# Patient Record
Sex: FEMALE | Race: WHITE | URBAN - METROPOLITAN AREA
[De-identification: names, ages, dates, MRNs, and addresses within clinical notes are randomized per-mention and may not be internally consistent; named-entity substitution may affect disease eponyms.]

---

## 2017-11-27 ENCOUNTER — HOSPITAL ENCOUNTER (OUTPATIENT)
Dept: BEHAVIORAL HEALTH | Facility: CLINIC | Age: 22
Discharge: HOME | End: 2017-11-27
Attending: SOCIAL WORKER | Admitting: SOCIAL WORKER
Payer: COMMERCIAL

## 2017-11-27 VITALS — HEIGHT: 59 IN | WEIGHT: 142 LBS | BODY MASS INDEX: 28.63 KG/M2

## 2017-11-27 PROCEDURE — H0001 ALCOHOL AND/OR DRUG ASSESS: HCPCS

## 2017-11-27 ASSESSMENT — ANXIETY QUESTIONNAIRES
5. BEING SO RESTLESS THAT IT IS HARD TO SIT STILL: SEVERAL DAYS
IF YOU CHECKED OFF ANY PROBLEMS ON THIS QUESTIONNAIRE, HOW DIFFICULT HAVE THESE PROBLEMS MADE IT FOR YOU TO DO YOUR WORK, TAKE CARE OF THINGS AT HOME, OR GET ALONG WITH OTHER PEOPLE: EXTREMELY DIFFICULT
1. FEELING NERVOUS, ANXIOUS, OR ON EDGE: SEVERAL DAYS
3. WORRYING TOO MUCH ABOUT DIFFERENT THINGS: SEVERAL DAYS
6. BECOMING EASILY ANNOYED OR IRRITABLE: SEVERAL DAYS
4. TROUBLE RELAXING: SEVERAL DAYS
GAD7 TOTAL SCORE: 7
2. NOT BEING ABLE TO STOP OR CONTROL WORRYING: SEVERAL DAYS
7. FEELING AFRAID AS IF SOMETHING AWFUL MIGHT HAPPEN: SEVERAL DAYS

## 2017-11-27 ASSESSMENT — PATIENT HEALTH QUESTIONNAIRE - PHQ9: SUM OF ALL RESPONSES TO PHQ QUESTIONS 1-9: 7

## 2017-11-27 ASSESSMENT — PAIN SCALES - GENERAL: PAINLEVEL: SEVERE PAIN (6)

## 2017-11-27 NOTE — PROGRESS NOTES
"Rule 25 Assessment  Background Information   1. Date of Assessment Request 11/22/17 2. Date of Assessment  11/27/17 3. Date Service Authorized     4.   PETER Jimenez   5.  Phone Number   [phone] 6. Referent  [facility] CPS 7. Assessment Site  [facility]     8. Client Name   Yessenia Garcia 9. Date of Birth  1995 Age  22 year old 10. Gender  female  11. PMI/ Insurance No.  [id number]   12. Client's Primary Language:  English 13. Do you require special accommodations, such as an  or assistance with written material? No   14. Current Address: [address] [place] [address]   15. Client Phone Numbers: [phone] (home)      16. Tell me what has happened to bring you here today.    11/14/17- I was caught with a bag of clean needles and one dirty needle in my car and my son was in the car.  I had Benadryl in the car and I had my prescription pill on me but not in the bottle.  Charges pending, no court date.  [facility] CPS referred for evaluation.    17. Have you had other rule 25 assessments?     No    DIMENSION I - Acute Intoxication /Withdrawal Potential   1. Chemical use most recent 12 months outside a facility and other significant use history (client self-report)              X = Primary Drug Used   Age of First Use Most Recent Pattern of Use and Duration   Need enough information to show pattern (both frequency and amounts) and to show tolerance for each chemical that has a diagnosis   Date of last use and time, if needed   Withdrawal Potential? Requiring special care Method of use  (oral, smoked, snort, IV, etc)      Alcohol     unsp  age 19: wknds   \"years ago\" no oral      Marijuana/  Hashish   14  2-3x/week, 1-2 hits unsp no smoke      Cocaine/Crack     N/A           Meth/  Amphetamines   N/A  RX Adderall (since age 14)  Denies any abuse.         Heroin     22  past 2 mos: near daily, quarter gram/day 11/25/17  6pm no " IV  snort   x   Other Opiates/  Synthetics   12  Oxy  Past year: Near daily, up 60mg/day  1+ year: less frequent/less amt 15 no Oral  snort      Inhalants     N/A           Benzodiazepines     N/A  current RX Xanax  Prior RX Clonazpem (since age 14)         Hallucinogens     N/A           Barbiturates/  Sedatives/  Hypnotics N/A           Over-the-Counter Drugs   N/A           Other     N/A           Nicotine     N/A          2. Do you use greater amounts of alcohol/other drugs to feel intoxicated or achieve the desired effect?  Yes.  Or use the same amount and get less of an effect?  Yes.  Example: increase tolerance    3A. Have you ever been to detox?     No    3B. When was the first time?     NA    3C. How many times since then?     NA    3D. Date of most recent detox:     NA    4.  Withdrawal symptoms: Have you had any of the following withdrawal symptoms?  Past 12 months Recent (past 30 days)   None Sweating (Rapid Pulse)  Shaky / Jittery / Tremors  Unable to Sleep  Agitation  Headache  Fatigue / Extremely Tired  Sad / Depressed Feeling  Muscle Aches  Irritability  High Blood Pressure  Nausea / Vomiting  Dizziness  Diarrhea  Anxiety / Worried     's Visual Observations and Symptoms: No visible withdrawal symptoms at this time    Based on the above information, is withdrawal likely to require attention as part of treatment participation?  No    Dimension I Ratings   Acute intoxication/Withdrawal potential - The placing authority must use the criteria in Dimension I to determine a client s acute intoxication and withdrawal potential.    RISK DESCRIPTIONS - Severity ratin Client displays full functioning with good ability to tolerate and cope with withdrawal discomfort. No signs or symptoms of intoxication or withdrawal or resolving signs or symptoms.    REASONS SEVERITY WAS ASSIGNED (What about the amount of the person s use and date of most recent use and history of withdrawal problems suggests  the potential of withdrawal symptoms requiring professional assistance? )     No signs/symptoms of intoxication or withdrawal observed.  Client did report some discomfort this day but no immediate concern.         DIMENSION II - Biomedical Complications and Conditions   1. Do you have any current health/medical conditions?(Include any infectious diseases, allergies, or chronic or acute pain, history of chronic conditions)       Yes.   Illnesses/Medical Conditions you are receiving care for: fibromyalgia, eleonora RA, sciatica.    2. Do you have a health care provider? When was your most recent appointment? What concerns were identified?     [place] Dr. Kya Irizarry    3. If indicated by answers to items 1 or 2: How do you deal with these concerns? Is that working for you? If you are not receiving care for this problem, why not?      NA    4A. List current medication(s) including over-the-counter or herbal supplements--including pain management:     Current Outpatient Prescriptions   Medication     ALPRAZolam (XANAX PO)     FLUoxetine HCl (PROZAC PO)     Amphetamine-Dextroamphetamine (ADDERALL PO)     No current facility-administered medications for this encounter.        4B. Do you follow current medical recommendations/take medications as prescribed?     Yes    4C. When did you last take your medication?     11/27/17    5. Has a health care provider/healer ever recommended that you reduce or quit alcohol/drug use?     No    6. Are you pregnant?     No    7. Have you had any injuries, assaults/violence towards you, accidents, health related issues, overdose(s) or hospitalizations related to your use of alcohol or other drugs:     No    8. Do you have any specific physical needs/accommodations? No    Dimension II Ratings   Biomedical Conditions and Complications - The placing authority must use the criteria in Dimension II to determine a client s biomedical conditions  and complications.   RISK DESCRIPTIONS - Severity ratin Client has difficulty tolerating and coping with physical problems or has other biomedical problems that interfere with recovery and treatment. Client neglects or does not seek care for serious biomedical problems.    REASONS SEVERITY WAS ASSIGNED (What physical/medical problems does this person have that would inhibit his or her ability to participate in treatment? What issues does he or she have that require assistance to address?)    Client reports chronic pain and reports she does have primary care provider.  She dos not have medical interventions to address pain and health issues.         DIMENSION III - Emotional, Behavioral, Cognitive Conditions and Complications   1. (Optional) Tell me what it was like growing up in your family. (substance use, mental health, discipline, abuse, support)     Mom/dad never , raised by both parents.  1 younger half-brother (maternal), Mother mh/cd, dad gambler.  Maternal side uncles depression, and one drug addiction.    2. When was the last time that you had significant problems...  A. with feeling very trapped, lonely, sad, blue, depressed or hopeless  about the future? Past Month    B. with sleep trouble, such as bad dreams, sleeping restlessly, or falling  asleep during the day? Past Month    C. with feeling very anxious, nervous, tense, scared, panicked, or like  something bad was going to happen? Past Month    D. with becoming very distressed and upset when something reminded  you of the past? Never    E. with thinking about ending your life or committing suicide? Never    3. When was the last time that you did the following things two or more times?  A. Lied or conned to get things you wanted or to avoid having to do  something? 2 - 12 months ago    B. Had a hard time paying attention at school, work, or home? Past Month    C. Had a hard time listening to instructions at school, work, or home? 1+ years  ago    D. Were a bully or threatened other people? Never    E. Started physical fights with other people? Never    Note: These questions are from the Global Appraisal of Individual Needs--Short Screener. Any item marked  past month  or  2 to 12 months ago  will be scored with a severity rating of at least 2.     For each item that has occurred in the past month or past year ask follow up questions to determine how often the person has felt this way or has the behavior occurred? How recently? How has it affected their daily living? And, whether they were using or in withdrawal at the time?    CPS involvement, chronic pain    4A. If the person has answered item 2E with  in the past year  or  the past month , ask about frequency and history of suicide in the family or someone close and whether they were under the influence.     NA    Any history of suicide in your family? Or someone close to you?     No    4B. If the person answered item 2E  in the past month  ask about  intent, plan, means and access and any other follow-up information  to determine imminent risk. Document any actions taken to intervene  on any identified imminent risk.      NA    5A. Have you ever been diagnosed with a mental health problem?     Yes, If yes explain: depression and anxiety    5B. Are you receiving care for any mental health issues? If yes, what is the focus of that care or treatment?  Are you satisfied with the service? Most recent appointment?  How has it been helpful?     Had a therapist, but missed last appt so need to get rescheduled of new therapist.      6. Have you been prescribed medications for emotional/psychological problems?     Yes.  6B. Current mental health medication(s) If these medications are listed for Dimension II, reference item II-5. See Dim2.   6C. Are you taking your medications as instructed?  yes.    7. Does your MH provider know about your use?     No    8A. Have you ever been verbally, emotionally,  physically or sexually abused?      No     Follow up questions to learn current risk, continuing emotional impact.      NA    8B. Have you received counseling for abuse?      N/A    9. Have you ever experienced or been part of a group that experienced community violence, historical trauma, rape or assault?     No    10A. :    No    11. Do you have problems with any of the following things in your daily life?    Concentration    Note: If the person has any of the above problems, follow up with items 12, 13, and 14. If none of the issues in item 11 are a problem for the person, skip to item 15.    I am on medication from the doctor    12. Have you been diagnosed with traumatic brain injury or Alzheimer s?  No    13. If the answer to #12 is no, ask the following questions:    Have you ever hit your head or been hit on the head? No    Were you ever seen in the Emergency Room, hospital or by a doctor because of an injury to your head? No    Have you had any significant illness that affected your brain (brain tumor, meningitis, West Nile Virus, stroke or seizure, heart attack, near drowning or near suffocation)? No    14. If the answer to #12 is yes, ask if any of the problems identified in #11 occurred since the head injury or loss of oxygen. NA    15A. Highest grade of school completed:     10th grade    15B. Do you have a learning disability? Yes, ADHD    15C. Did you ever have tutoring in Math or English? No    15D. Have you ever been diagnosed with Fetal Alcohol Effects or Fetal Alcohol Syndrome? No    16. If yes to item 15 B, C, or D: How has this affected your use or been affected by your use?     NA    Dimension III Ratings   Emotional/Behavioral/Cognitive - The placing authority must use the criteria in Dimension III to determine a client s emotional, behavioral, and cognitive conditions and complications.   RISK DESCRIPTIONS - Severity ratin Client has difficulty with impulse control and lacks coping  skills. Client has thoughts of suicide or harm to others without means; however, the thoughts may interfere with participation in some treatment activities. Client has difficulty functioning in significant life areas. Client has moderate symptoms of emotional, behavioral, or cognitive problems. Client is able to participate in most treatment activities.    REASONS SEVERITY WAS ASSIGNED - What current issues might with thinking, feelings or behavior pose barriers to participation in a treatment program? What coping skills or other assets does the person have to offset those issues? Are these problems that can be initially accommodated by a treatment provider? If not, what specialized skills or attributes must a provider have?    Client reports depression, anxiety and ADHD.  She does have a behavioral health provider for medications but denies any individual therapy.  She denies any suicidal  Ideation.  PHQ-9 score 7 and MANUEL-7 score 7, referral for co-occurring services.         DIMENSION IV - Readiness for Change   1. You ve told me what brought you here today. (first section) What do you think the problem really is?     Chronic pain and finding relief.    2. Tell me how things are going. Ask enough questions to determine whether the person has use related problems or assets that can be built upon in the following areas: Family/friends/relationships; Legal; Financial; Emotional; Educational; Recreational/ leisure; Vocational/employment; Living arrangements (DSM)      CPS involvement.    3. What activities have you engaged in when using alcohol/other drugs that could be hazardous to you or others (i.e. driving a car/motorcycle/boat, operating machinery, unsafe sex, sharing needles for drugs or tattoos, etc     Denies sharing needles.    4. How much time do you spend getting, using or getting over using alcohol or drugs? (DSM)     Daily.    5. Reasons for drinking/drug use (Use the space below to record answers. It may  not be necessary to ask each item.)  Like the feeling No   Trying to forget problems Yes   To cope with stress Yes   To relieve physical pain Yes   To cope with anxiety Yes   To cope with depression Yes   To relax or unwind Yes   Makes it easier to talk with people No   Partner encourages use No   Most friends drink or use No   To cope with family problems No   Afraid of withdrawal symptoms/to feel better Yes   Other (specify)  N/A     A. What concerns other people about your alcohol or drug use/Has anyone told you that you use too much? What did they say? (DSM)     See collateral information.    B. What did you think about that/ do you think you have a problem with alcohol or drug use?     It's just to make me feel better throughout the day.    6. What changes are you willing to make? What substance are you willing to stop using? How are you going to do that? Have you tried that before? What interfered with your success with that goal?      I just want to be better overall so I can be a better mother.  I don't think it really affects me but I just want to feel better and not have to worry about feeling crappy.    7. What would be helpful to you in making this change?     I would like to get on Suboxone.    Dimension IV Ratings   Readiness for Change - The placing authority must use the criteria in Dimension IV to determine a client s readiness for change.   RISK DESCRIPTIONS - Severity ratin Client is motivated with active reinforcement, to explore treatment and strategies for change, but ambivalent about illness or need for change.    REASONS SEVERITY WAS ASSIGNED - (What information did the person provide that supports your assessment of his or her readiness to change? How aware is the person of problems caused by continued use? How willing is she or he to make changes? What does the person feel would be helpful? What has the person been able to do without help?)      Client was cooperative and appears  motivated to make changes; however, she does not see addiction as primary concern.         DIMENSION V - Relapse, Continued Use, and Continued Problem Potential   1. In what ways have you tried to control, cut-down or quit your use? If you have had periods of sobriety, how did you accomplish that? What was helpful? What happened to prevent you from continuing your sobriety? (DSM)     I have taken Suboxone on my own and it has helped. Longest without opiates was couple weeks and most recent was about a month ago.    2. Have you experienced cravings? If yes, ask follow up questions to determine if the person recognizes triggers and if the person has had any success in dealing with them.     Not really.    3. Have you been treated for alcohol/other drug abuse/dependence?     No    4. Support group participation: Have you/do you attend support group meetings to reduce/stop your alcohol/drug use? How recently? What was your experience? Are you willing to restart? If the person has not participated, is he or she willing?     No.    5. What would assist you in staying sober/straight?     Medication-assisted therapy    Dimension V Ratings   Relapse/Continued Use/Continued problem potential - The placing authority must use the criteria in Dimension V to determine a client s relapse, continued use, and continued problem potential.   RISK DESCRIPTIONS - Severity ratin No awareness of the negative impact of mental health problems or substance abuse. No coping skills to arrest mental health or addiction illnesses, or prevent relapse.    REASONS SEVERITY WAS ASSIGNED - (What information did the person provide that indicates his or her understanding of relapse issues? What about the person s experience indicates how prone he or she is to relapse? What coping skills does the person have that decrease relapse potential?)      Client lacks education on addiction or coping skills.  And does have unmanaged pain that will be barrier  to abstinence.         DIMENSION VI - Recovery Environment   1. Are you employed/attending school? Tell me about that.     Not working and not in school.    2A. Describe a typical day; evening for you. Work, school, social, leisure, volunteer, spiritual practices. Include time spent obtaining, using, recovering from drugs or alcohol. (DSM)     I wake up with my son, get him dressed and fed, afternoon around 3 I would go get it and then come home, use.  Then just spend the rest of the day with my son.    2B. How often do you spend more time than you planned using or use more than you planned? (DSM)     None.    3. How important is using to your social connections? Do many of your family or friends use?     I don't really have any friends.    4A. Are you currently in a significant relationship?     No    4C. Sexual Orientation:     Heterosexual    5A. Who do you live with?      Mom, uncle and brother    5B. Tell me about their alcohol/drug use and mental health issues.     No.    5C. Are you concerned for your safety there? No    5D. Are you concerned about the safety of anyone else who lives with you? No    6A. Do you have children who live with you?     No    6B. Do you have children who do not live with you?     Yes.  (Ask follow up questions to learn where the children are, who has custody and what the person s relationship and responsibility is with these children and what hopes the person has for his or her future with these children.) son (4yo), currently staying with dad due to CPS.  History of CPS investigation but case dismissed.    7A. Who supports you in making changes in your alcohol or drug use? What are they willing to do to support you? Who is upset or angry about you making changes in your alcohol or drug use? How big a problem is this for you?      Family    7B. This table is provided to record information about the person s relationships and available support It is not necessary to ask each item;  only to get a comprehensive picture of their support system.  How often can you count on the following people when you need someone?   Partner / Spouse Always supportive   Parent(s)/Aunt(s)/Uncle(s)/Grandparents Always supportive   Sibling(s)/Cousin(s) Always supportive   Child(ken) Always supportive   Other relative(s) N/A   Friend(s)/neighbor(s) N/A   Child(ken) s father(s)/mother(s) Always supportive   Support group member(s) N/A   Community of [place] members N/A   /counselor/therapist/healer N/A   Other (specify) N/A     8A. What is your current living situation?     Independent living with family    8B. What is your long term plan for where you will be living?     No change.    8C. Tell me about your living environment/neighborhood? Ask enough follow up questions to determine safety, criminal activity, availability of alcohol and drugs, supportive or antagonistic to the person making changes.      No concerns reported.    9. Criminal justice history: Gather current/recent history and any significant history related to substance use--Arrests? Convictions? Circumstances? Alcohol or drug involvement? Sentences? Still on probation or parole? Expectations of the court? Current court order? Any sex offenses - lifetime? What level? (DSM)    Theft charge, currently paying it off.  Denies any current probation.  Current CPS, I am on color code for UA's.    10. What obstacles exist to participating in treatment? (Time off work, childcare, funding, transportation, pending care home time, living situation)     None    Dimension VI Ratings   Recovery environment - The placing authority must use the criteria in Dimension VI to determine a client s recovery environment.   RISK DESCRIPTIONS - Severity rating: 3 Client is not engaged in structured, meaningful activity and the client s peers, family, significant other, and living environment are unsupportive, or there is significant criminal justice system  involvement.    REASONS SEVERITY WAS ASSIGNED - (What support does the person have for making changes? What structure/stability does the person have in his or her daily life that will increase the likelihood that changes can be sustained? What problems exist in the person s environment that will jeopardize getting/staying clean and sober?)     Client is single, has one son that she has lost custody of due to CPS involvement.  She denies any history of legal issues but has pending legal charges related to substances.  She is not working or in school, she lacks any structured daily activity.  She has minimal social network but does have supportive family.         Client Choice/Exceptions   Would you like services specific to language, age, gender, culture, Mormon preference, race, ethnicity, sexual orientation or disability?  No    What particular treatment choices and options would you like to have? open    Do you have a preference for a particular treatment program? open    Criteria for Diagnosis     Criteria for Diagnosis  DSM-5 Criteria for Substance Use Disorder  Instructions: Determine whether the client currently meets the criteria for Substance Use Disorder using the diagnostic criteria in the DSM-V pp.[phone]. Current means during the most recent 12 months outside a facility that controls access to substances    Category of Substance Severity (ICD-10 Code / DSM 5 Code)     Alcohol Use Disorder NA   Cannabis Use Disorder Mild  (F12.10) (305.20)   Hallucinogen Use Disorder NA   Inhalant Use Disorder NA   Opioid Use Disorder Severe   (F11.20) (304.00)   Sedative, Hypnotic, or Anxiolytic Use Disorder NA   Stimulant Related Disorder NA   Tobacco Use Disorder NA   Other (or unknown) Substance Use Disorder NA       Collateral Contact Summary   Number of contacts made: 3    Contact with referring person:  Yes.    If court related records were reviewed, summarize here: NA    Information from collateral contacts  supported/largely agreed with information from the client and associated risk ratings.      Rule 25 Assessment Summary and Plan   's Recommendation    Client is recommended to seek medication management therapy as well as outpatient treatment to develop behavioral coping skills and education on addiction.  She is recommended to comply with legal and child protection requirements, remain medication compliant, and explore alternative pain management options with medical provider.      Collateral Contacts     Name:    Salima Carrington   Relationship:    mother   Phone Number:    [phone] Releases:    Yes     It scares me.  I want her to get better so she can be the person she can be, she is wonderful person.  Anxiety and overall how she feels about herself.      Collateral Contacts     Name:    SAIMA Peña   Relationship:    friend   Phone Number:    [phone]   Releases:    Yes     Voicemail left 11/30/17 @ 10:35am.      Collateral Contacts     Name:    Yulissa Malone   Relationship:    [facility]   Phone Number:    [phone]  [phone]   Releases:    Yes     Voicemail left 11/30/17.  Faxed [employer] 12/5/17 for collateral at request.  Voicemail left 12/6/17 @ 2:50pm.  She is on UA's and they are coming up positive.  One was positive for 3 substances: marijuana, amphetamines, and opiates.  She did admit she has been abusing prescriptions.  ollateral Contacts      A problematic pattern of alcohol/drug use leading to clinically significant impairment or distress, as manifested by at least two of the following, occurring within a 12-month period:    Alcohol/drug is often taken in larger amounts or over a longer period than was intended.  A great deal of time is spent in activities necessary to obtain alcohol, use alcohol, or recover from its effects.  Craving, or a strong desire or urge to use alcohol/drug  Continued alcohol use despite having persistent or recurrent social or interpersonal problems caused  or exacerbated by the effects of alcohol/drug.  Recurrent alcohol/drug use in situations in which it is physically hazardous.  Alcohol/drug use is continued despite knowledge of having a persistent or recurrent physical or psychological problem that is likely to have been caused or exacerbated by alcohol.  Tolerance, as defined by either of the following: A need for markedly increased amounts of alcohol/drug to achieve intoxication or desired effect. and A markedly diminished effect with continued use of the same amount of alcohol/drug.  Withdrawal, as manifested by either of the following: The characteristic withdrawal syndrome for alcohol/drug (refer to Criteria A and B of the criteria set for alcohol/drug withdrawal). and Alcohol/drug (or a closely related substance, such as a benzodiazepine) is taken to relieve or avoid withdrawal symptoms.      Specify if: In early remission:  After full criteria for alcohol/drug use disorder were previously met, none of the criteria for alcohol/drug use disorder have been met for at least 3 months but for less than 12 months (with the exception that Criterion A4,  Craving or a strong desire or urge to use alcohol/drug  may be met).     In sustained remission:   After full criteria for alcohol use disorder were previously met, non of the criteria for alcohol/drug use disorder have been met at any time during a period of 12 months or longer (with the exception that Criterion A4,  Craving or strong desire or urge to use alcohol/drug  may be met).   Specify if:   This additional specifier is used if the individual is in an environment where access to alcohol is restricted.    Mild: Presence of 2-3 symptoms    Moderate: Presence of 4-5 symptoms    Severe: Presence of 6 or more symptoms

## 2017-11-27 NOTE — PROGRESS NOTES
[facility]  [id number] [place] [address], [address]  [place] [address]        ADULT CD ASSESSMENT ADDENDUM      Patient Name: Yessenia Garcia  Cell Phone:   Home: [phone] (home)    Mobile:   Telephone Information:   Mobile [phone]       Email:  [email or web address]  Emergency Contact: Salima Carrington   Tel: [phone]    ________________________________________________________________________      The patient is  Single, no serious involvement    With which race do you identify? White    Family History   Mother   Living Father   Living   No Step-mother   NA No Step-father   NA   Maternal Grandmother   Living Fraternal  Grandmother    Maternal Grandfather     Fraternal   Grandfather NA   No Sister(s)   NA No Brother(s)   NA   No Half-sister(s)   NA 1 Half-brother(s)   Living             Who raised you? (parents, grandparents, adoptive parents, step-parents, etc.)    Both Parents    Have any of your family members or significant others had problems with mental illness or substance abuse?  Please explain.    Mother    Do you have any children or Stepchildren? Yes, please explain: Obey (40    Are you being investigated by Child Protection Services? Yes, please explain: [facility]    Do you have a child protection worker, probation office or ? No    How would you describe your current finances?  n/a    If you are having problems, (unpaid bills, bankruptcy, IRS problems) please explain:  n/a    If working or a student are you able to function appropriately in that setting? n/a    Describe your preferred learning style:  Not specified    What personal strengths do you have that can help you get sober?  n/a    Do you currently self-administer your medications?  Yes    Have you ever had to lie to people important to you about how much you cannon?     No   Have you ever felt the need to bet more and more money?     No   Have you ever attempted treatment for a gambling  problem?     No   Have you ever touched or fondled someone else inappropriately or forced them to have sex with you against their will?     No   Are you or have you ever been a registered sex offender?     No   Is there any history of sexual abuse in your family?     No   Have you ever felt obsessed by your sexual behavior, such as having sex with many partners, masturbating often, using pornography often?     No   Have you ever received therapy or stayed in the hospital for mental health problems?     No   Have you ever hurt yourself, such as cutting, burning or hitting yourself?     No   Have you ever purged, binged or restricted yourself as a way to control your weight?     No   Are you on a special diet?     No   Do you have any concerns regarding your nutritional status?     No   Have you had any appetite changes in the last 3 months?     No   Have you had any weight loss or weight gain in the last 3 months?    If weight patient gained or lost was more than 10 lbs, then refer to program RN / attending Physician for assessment.     Yes, If yes explain: gained like 10lbs   Was the patient informed of BMI?    Above,  General nutrition education     Yes   Do you have any dental problems?     No   Have you ever lived through any trauma or stressful life events?     Yes, If yes explain: current CPS and loss of child   In the past month, have you had any of the following symptoms related to the trauma listed above? (dreams, intense memories, flashbacks, physical reactions, etc.)     No   Have you ever believed people were spying on you, or that someone was plotting against you or trying to hurt you?     No   Have you ever believed someone was reading your mind or could hear your thoughts or that you could actually read someone's mind or hear what another person was thinking?     No   Have you ever believed that someone of some force outside of yourself was putting thoughts into your mind or made you act in a way that  was not your usual self?  Have you ever though you were possessed?     No   Have you ever believed you were being sent special messages through the TV, radio or newspaper?     No   Have you ever heard things other people couldn't hear, such as voices or other noises?     No   Have you ever had visions when you were awake?  Or have you ever seen things other people couldn't see?     No       Suicide Screening Questions:   1. Are you feeling hopeless about the present/future?     Yes, If yes explain: legal   2. Have you ever had thoughts about taking your life?     No   3. When did you have these thoughts?     NA   4. Do you have any current intent or active desire to take your life?     No   5. Do you have a plan to take your life?     No   6. Have you ever made a suicide attempt?     No   7. Do you have access to pills, guns or other methods to kill yourself?     Yes, If yes explain: pills     Guide to Risk Ratings   IDEATION: Active thoughts of suicide? INTENT: Intent to follow on suicide? PLAN: Plan to follow through on suicide? Level of Risk:   IF Yes Yes Yes Patient = High Emergent   IF Yes Yes No Patient = High Urgent/Non-Emergent   IF Yes No No Patient = Moderate Non-Urgent   IF No No   No Patient = Low Risk   The patient's ADDITIONAL RISK FACTORS and lack of PROTECTIVE FACTORS may increase their overall suicide risk ratings.     Patient's Responses (within the last 30 days)   IDEATION: Active thoughts of suicide?    No     INTENT: Intent to follow on suicide?    No     PLAN: Plan to follow through on suicide?    No     Determining the level of risk depends on the patient responses, suicide risk factors and protective factors.     Additional Risk Factors: A recent loss that was significant to the patient, i.e. loss of job, loss of home, divorce, break-up, etc.  Significant legal problems including being at risk of incarceration  Substance abuse   Protective Factors:  Having people in his/her life that would  "prevent the patient from considering committing suicide (i.e. young children, spouse, parents, etc.)  Having easy access to supportive family members     Risk Status   Emergent? No   Urgent / Non-Emergent? No   Present / Non- Urgent? No    Low Risk? Yes, Evaluation Counselors - Document in Epic / SBAR to counselor \"No identified risk\" and Treatment Counselors - Assess weekly in progress notes under Dimension 3 and summarize in Discharge / Treatment summary under Dimension 3.   Additional information to support suicide risk rating: See Above       Mental Health Status   Physical Appearance/Attire: Appears stated age and Attire appropriate to age/situation   Hygiene: well groomed   Eye Contact: at examiner   Speech Rate:  regular   Speech Volume: regular   Speech Quality: fluid   Cognitive/Perceptual:  reality based   Cognition: memory intact    Judgment: intact   Insight: intact   Orientation:  time, place, person and situation   Thought::   logical    Hallucinations:  none   General Behavioral Tone: cooperative   Psychomotor Activity: no problem noted   Gait:  no problem   Mood: appropriate   Affect: congruence/appropriate   Counselor Notes: NA       Criteria for Diagnosis: DSM-5 Criteria for Substance Use Disorders      Cannabis Use Disorder Mild - 305.20 (F12.10)  Opioid Use Disorder Severe - 304.00 (F11.20)      Level of Care   I.) Intoxication and Withdrawal: 0   II.) Biomedical:  2   III.) Emotional and Behavioral:  2   IV.) Readiness to Change:  1   V.) Relapse Potential: 4   VI.) Recovery Environmental: 3       Initial Problem List     The patient lacks relapse prevention skills  The patient has current child protection and/or child custody issues    Patient/Client is willing to follow treatment recommendations.  Yes    Counselor: PETER Villa      Vulnerable Adult Checklist for OUTPATIENTS     1.  Do you have a physical, emotional or mental infirmity or dysfunction?       No    2.  Does this " issue impair your ability to provide for your own care without help, including providing yourself with food, shelter, clothing, healthcare or supervision?       No    3.  Because of this issue, I need assistance to protect myself from maltreatment by others.      No    Based on the above information:    This person is not a functional Vulnerable Adult according to [place] Statute 626.5572 subdivision 21.

## 2017-11-28 ASSESSMENT — ANXIETY QUESTIONNAIRES: GAD7 TOTAL SCORE: 7

## 2017-12-07 NOTE — PROGRESS NOTES
CHEMICAL DEPENDENCY ASSESSMENT      EVALUATION COUNSELOR:  PETER Villa.   CLIENT'S ADDRESS:  [address] [place] [address].   TELEPHONE NUMBER:  [phone].   STATISTICS:  YOB: 1995.  Age:  22.  Sex:  Female.  Marital Status:  Single.   DATE OF EVALUATION:  11/27/2017.   INSURANCE:  [place].   REFERRAL SOURCE:  [facility].      REASON FOR EVALUATION:  Yessenia Garcia reports she had been pulled over and had prescription pills as well as needles in her vehicle, she has pending legal charges.  Her child was also in the vehicle and child protection is involved and they are requiring a chemical health assessment.      HEALTH HISTORY AND MEDICATIONS:  Client reports fibromyalgia, eleonora RA and sciatica.  She has a primary care provider through [facility] in [place] and also her Behavioral Health provider is Dr. Kya Hazel.  Current medications are Xanax, Prozac and Adderall.      HISTORY OF PREVIOUS TREATMENT AND COUNSELING:  Client denies any history of detox admissions or any hospitalizations related to drug or alcohol use.  Reports she has had a therapist in the past but not currently due to missed appointments.  Reports no history of chemical dependency treatment or support groups.      HISTORY OF ALCOHOL AND DRUG USE:  Client reports opiate use.  Age of first use 12.  Reports she was first prescribed OxyContin due to chronic pain issues.  Prior to year ago she was using it less frequently and less in amount, but reports this past year she has been using near daily up to 60 mg per day.  Reports this past year she also began using heroin, using nearly 1/4 gram of heroin a day, has been using intravenously and last use was 11/25/2017 of heroin.  Last use of opiates pills was 11/14/2017.  Client also reports marijuana use, age of first use 14, reports 2-3 times a week, she will only take like 1 or 2 hits, unspecified last use.   She reports history of alcohol use around the age of 19 on weekends but reports she has not drank in quite a few years.  She denies any abuse of her Adderall prescription or Xanax prescription and denies any other substance use.      SUMMARY OF CHEMICAL DEPENDENCY SYMPTOMS ACKNOWLEDGED BY CLIENT:  Client identifies with 8 out of the 11 DSM-V criteria for impression of a substance use disorder.      SUMMARY OF COLLATERAL DATA:  Client's mother, Salima Carrington, attended evaluation.  She confirms client's health issues and reports her substance use does scare her.  She does feel her use is more related to managing pain versus addiction; however, does understand addiction and would like client find additional ways to manage chronic pain.  Also, spoke with Yulissa Malone, she confirms legal and CPS inovlement.  She reports concerns that client has been providing UA's which have continued to be positive.      MENTAL STATUS ASSESSMENT:  Physical appearance and attire:  Appears stated age, attire appropriate to age and situation.  Hygiene well groomed.  Eye contact at examiner.  Speech regular, volume regular, quality fluid.  Cognitive perceptual reality based.  Cognition:  Memory intact, judgment intact, insight intact.  Orientation to time, place, person and situation.  Thought logical.  Hallucinations:  None.  General behavioral tone:  Cooperative.  Psychomotor activity:  No problem noted.  Gait:  No problem.  Mood appropriate.  Affect congruent and appropriate.      VULNERABLE ADULT ASSESSMENT:  This person is not a functional vulnerable adult according to [place] statute 626.5572, subdivision 21.      DIAGNOSTIC IMPRESSION:    1.  F12.10, cannabis use disorder, mild.   2.  F11.20, opioid use disorder, severe.      [facility] PLACEMENT CRITERIA:   DIMENSION 1:  Intoxication/Withdrawal:  Risk level 0.  No signs or symptoms of intoxication or withdrawal observed.  Client did report some discomfort this day but no immediate  concerns.      DIMENSION 2:  Biomedical Conditions:  Risk level 2.  Client reports chronic pain and reports she does have a primary care provider.  She does not have medical interventions to address pain and health issues.      DIMENSION 3:  Emotional/Behavioral:  Risk level 2.  Client reports depression, anxiety, and ADHD.  She does have a Behavioral Health provider for medications but denies any individual therapy.  She denies any suicidal ideation.  PHQ-9 score of 7 and MANUEL score of 7, referral for co-occurring services.      DIMENSION 4:  Readiness to Change:  Risk level 1.  Client was cooperative and appears motivated to make changes; however, she does not see addiction as a primary concern.      DIMENSION 5:  Relapse and Continued Use Potential:  Risk level 4.  Client lacks education on addiction or coping skills.  She does have unmanaged pain that will be a barrier to abstinence.       DIMENSION 6:  Recovery Environment:  Risk level 3.  The client is single and has 1 son that she has lost custody of due to CPS involvement.  She denies any history of legal issues, but has pending legal charges related to substances.  She is not working or in school.  She lacks any structured daily activity.  She has minimal social networks and does have supportive family.      INITIAL PROBLEM LIST:  Client lacks relapse prevention and has CPS involvement.      RECOMMENDATIONS:  Client is recommended to seek medication management therapy as well as outpatient treatment to develop behavioral coping skills and education on addiction.  She is recommended to comply with legal and child protection requirements, remain medication compliant, and explore alternative pain management options with medical provider.     RATIONALE:  Client meets criteria for substance use disorder.  It appears she has sought out medication management, and she would benefit from outpatient in order to further establish skills in daily living for ongoing  abstinence as well as education on addiction.         This information has been disclosed to you from records protected by Federal confidentiality rules (42 CFR part 2). The Federal rules prohibit you from making any further disclosure of this information unless further disclosure is expressly permitted by the written consent of the person to whom it pertains or as otherwise permitted by 42 CFR part 2. A general authorization for the release of medical or other information is NOT sufficient for this purpose. The Federal rules restrict any use of the information to criminally investigate or prosecute any alcohol or drug abuse patient.      LYNN MADRIGAL [facility]             D: 2017 15:03   T: 2017 04:44   MT: lg      Name:     KIT FOUNTAIN   MRN:      [id number]        Account:      [id number]   :      1995           Visit Date:   2017      Document: [id number]